# Patient Record
Sex: MALE | Race: AMERICAN INDIAN OR ALASKA NATIVE | NOT HISPANIC OR LATINO
[De-identification: names, ages, dates, MRNs, and addresses within clinical notes are randomized per-mention and may not be internally consistent; named-entity substitution may affect disease eponyms.]

---

## 2023-02-22 PROBLEM — Z00.00 ENCOUNTER FOR PREVENTIVE HEALTH EXAMINATION: Status: ACTIVE | Noted: 2023-02-22

## 2023-02-27 ENCOUNTER — APPOINTMENT (OUTPATIENT)
Dept: OTOLARYNGOLOGY | Facility: CLINIC | Age: 33
End: 2023-02-27
Payer: MEDICAID

## 2023-02-27 ENCOUNTER — NON-APPOINTMENT (OUTPATIENT)
Age: 33
End: 2023-02-27

## 2023-02-27 VITALS
HEART RATE: 78 BPM | WEIGHT: 156 LBS | BODY MASS INDEX: 25.07 KG/M2 | SYSTOLIC BLOOD PRESSURE: 119 MMHG | DIASTOLIC BLOOD PRESSURE: 76 MMHG | HEIGHT: 66.14 IN

## 2023-02-27 PROCEDURE — 99204 OFFICE O/P NEW MOD 45 MIN: CPT

## 2023-02-27 RX ORDER — FLUTICASONE FUROATE AND VILANTEROL 200; 25 UG/1; UG/1
POWDER RESPIRATORY (INHALATION)
Refills: 0 | Status: DISCONTINUED | COMMUNITY

## 2023-02-27 NOTE — PHYSICAL EXAM
[Normal] : mucosa is normal [Midline] : trachea located in midline position [de-identified] : obstructing polyps R>L

## 2023-02-27 NOTE — REVIEW OF SYSTEMS
[Post Nasal Drip] : post nasal drip [Nasal Congestion] : nasal congestion [Negative] : Heme/Lymph [As Noted in HPI] : as noted in HPI [FreeTextEntry6] : noisy breathing

## 2023-02-27 NOTE — HISTORY OF PRESENT ILLNESS
[de-identified] : 32 yr old male w long hx of nasal polyps\par Had surgery in [place], but they've grown back.\par rare sinusitis\par occ epistaxis\par +flonase qd\par allergy eval last week +dog, milk.  No dogs at home\par -FH polyps\par -asthma

## 2023-02-27 NOTE — ASSESSMENT
[FreeTextEntry1] : Informed consent for steroids: We reviewed the risks of oral prednisone with include, but are not limited to: mood lability, irritability, appetite stimulation, anxiety, hypertension, difficulty sleeping, vivid dreams, hyperglycemia, cataracts, increased intra-ocular pressure, GI upset, increased bone turnover causing or exacerbating osteopenia, and risk of avascular neurosis of the hip and long bones. Verbal informed consent was obtained for the use of prednisone.\par \par rx medrol dosepak\par continue flonase\par \par CT PNS\par f/u after study is complete for surgical eval

## 2023-03-06 ENCOUNTER — APPOINTMENT (OUTPATIENT)
Dept: CT IMAGING | Facility: CLINIC | Age: 33
End: 2023-03-06
Payer: MEDICAID

## 2023-03-06 ENCOUNTER — TRANSCRIPTION ENCOUNTER (OUTPATIENT)
Age: 33
End: 2023-03-06

## 2023-03-06 PROCEDURE — 70486 CT MAXILLOFACIAL W/O DYE: CPT

## 2023-03-15 ENCOUNTER — APPOINTMENT (OUTPATIENT)
Dept: OTOLARYNGOLOGY | Facility: CLINIC | Age: 33
End: 2023-03-15
Payer: MEDICAID

## 2023-03-15 VITALS
SYSTOLIC BLOOD PRESSURE: 112 MMHG | BODY MASS INDEX: 26.57 KG/M2 | DIASTOLIC BLOOD PRESSURE: 71 MMHG | HEIGHT: 66.14 IN | HEART RATE: 67 BPM | WEIGHT: 165.34 LBS

## 2023-03-15 PROCEDURE — 99214 OFFICE O/P EST MOD 30 MIN: CPT | Mod: 25

## 2023-03-15 PROCEDURE — 31231 NASAL ENDOSCOPY DX: CPT

## 2023-03-15 RX ORDER — BUDESONIDE 0.5 MG/2ML
0.5 INHALANT ORAL
Qty: 120 | Refills: 0 | Status: ACTIVE | COMMUNITY
Start: 2023-03-15 | End: 1900-01-01

## 2023-03-15 NOTE — DATA REVIEWED
[de-identified] : CT sinus:\par FINDINGS:\par FRONTAL SINUSES:  Opacified by abnormal soft tissue bilaterally\par ETHMOID SINUSES:  Opacified on the left and nearly opacified on the right with sinus expansion most notable involving a posterior ethmoid air cell/Onodi cell on the right with elevation of the sinus roof and associated bone dehiscence (601:105)\par MAXILLARY SINUSES:  Marked polypoid mucosal thickening\par SPHENOID SINUSES:  Opacified by abnormal soft tissue with partial erosion of the sinus septum.\par NASAL SEPTUM:  Deviated to the left\par NASAL CAVITY/NASOPHARYNX:  Multiple polypoid soft tissue masses are seen nearly obstructing the nasal cavities bilaterally. There is partial aeration of the inferior meatus/lower central nasal cavity. Polypoid soft tissue extends into the anterior nasopharynx bilaterally.\par POSTOP CHANGES:  There is nonvisualization of multiple ethmoid septations as well as the uncinate processes and medial maxillary sinus wall. While this is likely due to deossification from chronic inflammation, the possibility of prior surgery is not excluded.\par RIGHT OSTIOMEATAL UNIT:  See above. Abnormal soft tissue obstructs the physiologic infundibulum and middle meatus.\par LEFT OSTIOMEATAL UNIT:  See above. Abnormal soft tissue obstructs the infundibulum and middle meatus.\par SPHENOETHMOIDAL RECESSES:  Obstructed by abnormal soft tissue\par RIGHT FRONTAL RECESS:  Obstructed by abnormal soft tissue\par LEFT FRONTAL RECESS:  Obstructed by abnormal soft tissue. An intersinus septal cell drains into the left frontal recess.\par BONES:  Focal areas of bone dehiscence are seen involving the right cribriform plate, fovea ethmoidalis and planum sphenoidale (601: 89, 91, 95, 105). There is focal dehiscence of the right optic canal (601:109). There is dehiscence of the left sphenoid roof at the interface with the internal carotid artery (601:113).\par VISUALIZED INTRACRANIAL STRUCTURES:  Normal.\par ORBITAL CONTENTS:  Normal.\par MISCELLANEOUS:  Central hyperdense soft tissue is seen within all sinuses.\par \par IMPRESSION:   Sinus opacification with bone expansion and multiple nasal polyps compatible with sinonasal polyposis.\par \par Hyperdense soft tissue is seen within the sinuses likely representing allergic fungal sinusitis.\par \par Nasal septal deviation to the left.\par \par Polypoid soft tissue producing near complete obstruction of the nasal cavities.\par \par Obstruction of anterior and posterior drainage pathways.\par \par Focal areas sinocranial bone dehiscence as described above.

## 2023-03-15 NOTE — HISTORY OF PRESENT ILLNESS
[de-identified] : Abelardo Ibarra is a 33 yo male with hx chronic sinusitis with nasal polyposis s/p previous sinus surgery in [place] four months ago who presents for evaluation. He has worsening nasal obstruction for the past 8-9 months. He has been using aller-aime. He recently completed oral steroids and has been using flonase. After ths, he has had some relief. He notes forehead pressure. He notes yellow/green rhinorrhea and postnasal drainage. He denies recurrent sinus infections. He denies recent fevers chills, vision changes, or pain/restrictoin of extraocular movements. He denies constant clear rhinorrhea or salty/metallic taste. He has had allergy testing and this was positive for several allergens per his report.

## 2023-03-15 NOTE — REVIEW OF SYSTEMS
[Seasonal Allergies] : seasonal allergies [Nasal Congestion] : nasal congestion [Sinus Pressure] : sinus pressure [Discolored Nasal Discharge] : discolored nasal discharge [Negative] : Heme/Lymph

## 2023-03-15 NOTE — ASSESSMENT
[FreeTextEntry1] : Abelardo Ibarra presents for evaluation of chronic sinusitis with nasal polyposis. He has history of chronic rhinitis secondary to allergy. He had sinus surgery four years ago in [place]. He has been on flonase and medrol dose pack. On sinonasal endoscopy, he has septal deviation to the left and diffuse bilateral sinonasal polyposis. His CT sinus was reviewed and this showed evidecen of diffuse sinus disease and polyps as well as possible allergic fungal sinusitis. In addition, he has areas of skull base dehiscence, but has no clinical evidence of CSF leak. Will start budesonide rinses and antibiotics for his current infection. Will also refer to Dr. Muñiz for allergy evaluation as well as Dr. Arellano for possible surgery given his focal areas of skull base dehiscence.\par \par - Budesonide rinses BID.\par - Augmentin x 10 days. Side effects were discussed and include but are not limited to nausea, vomiting, diarrhea, and skin rash.\par - Refer to Dr. Muñiz and Dr. Arellano.\par - Follow up prn.

## 2023-03-20 ENCOUNTER — APPOINTMENT (OUTPATIENT)
Dept: PEDIATRIC ALLERGY IMMUNOLOGY | Facility: CLINIC | Age: 33
End: 2023-03-20
Payer: MEDICAID

## 2023-03-20 DIAGNOSIS — J30.89 OTHER ALLERGIC RHINITIS: ICD-10-CM

## 2023-03-20 DIAGNOSIS — R43.0 ANOSMIA: ICD-10-CM

## 2023-03-20 PROCEDURE — 99203 OFFICE O/P NEW LOW 30 MIN: CPT | Mod: 25

## 2023-03-20 PROCEDURE — 95004 PERQ TESTS W/ALRGNC XTRCS: CPT

## 2023-03-20 RX ORDER — METHYLPREDNISOLONE 4 MG/1
4 TABLET ORAL
Qty: 1 | Refills: 0 | Status: COMPLETED | COMMUNITY
Start: 2023-02-27 | End: 2023-03-20

## 2023-03-20 NOTE — REVIEW OF SYSTEMS
[Nasal Congestion] : nasal congestion [Reduced sense of smell] : reduced sense of smell [Nl] : Integumentary [Immunizations are up to date] : Immunizations are up to date

## 2023-03-20 NOTE — SOCIAL HISTORY
[House] : [unfilled] lives in a house  [Central Forced Air] : heating provided by central forced air [Central] : air conditioning provided by central unit [Bedroom] :  in bedroom [Living Area] : in living area [None] : none [Smokers in Household] : there are no smokers in the home

## 2023-03-20 NOTE — HISTORY OF PRESENT ILLNESS
[Asthma] : asthma [Eczematous rashes] : eczematous rashes [Food Allergies] : food allergies [Drug Allergies] : drug allergies [de-identified] : 32 y.o male presents for allergy evaluation. Patient has hx chronic sinusitis with nasal polyposis s/p previous sinus surgery in [place] four years ago.  Post op patient did very very but was not kept on any intranasal steroids. His nasal congestion resurfaced within 1-2 years but was mild. Now he has worsening nasal obstruction and anosmia for the past 8-9 months. He has been using fluticasone nasal spray with no relief. He recently completed medrol dose pack 2/27/23 which provided relief for about a month and symptoms resurfaced. He denies any concomitant asthma nor NSAID sensitivity. He's had allergy testing done by his pcp which showed pollen and dog allergy which he does not have. He denies recurrent sinusitis or infections of other kinds. \par \par He has seen ENT Dr. Trevor Martinez and on 3/15/23 sinonasal endoscopy showed septal deviation to the left and diffuse bilateral sinonasal polyposis. CT sinus 3/6/23 showed evidence of diffuse sinus disease and polyps as well as possible allergic fungal sinusitis and areas of skull base dehiscence. Patient started on Budesonide 0.5mg sinus rinses and placed on Augmentin 875mg BID x10d. Now that he is 5 days into regimen he has had partial improvement and feels polyps slightly smaller. Despite the improvement his congestion is quite severe.\par

## 2023-03-20 NOTE — CONSULT LETTER
[Dear  ___] : Dear  [unfilled], [Consult Letter:] : I had the pleasure of evaluating your patient, [unfilled]. [Please see my note below.] : Please see my note below. [Consult Closing:] : Thank you very much for allowing me to participate in the care of this patient.  If you have any questions, please do not hesitate to contact me. [Sincerely,] : Sincerely, [FreeTextEntry3] : Elvie HASSAN\par

## 2023-03-20 NOTE — IMPRESSION
[_____] : trees ([unfilled]) [Allergy Testing Mixed Feathers] : feathers [Allergy Testing Cockroach] : cockroach [Allergy Testing Cat] : cat [Allergy Testing Weeds] : weeds [Allergy Testing Grasses] : grasses [________] : [unfilled]

## 2023-03-20 NOTE — PHYSICAL EXAM
[Alert] : alert [Well Nourished] : well nourished [Healthy Appearance] : healthy appearance [No Acute Distress] : no acute distress [Well Developed] : well developed [Normal Voice/Communication] : normal voice communication [Normal Pupil & Iris Size/Symmetry] : normal pupil and iris size and symmetry [No Discharge] : no discharge [No Photophobia] : no photophobia [Sclera Not Icteric] : sclera not icteric [Normal TMs] : both tympanic membranes were normal [Normal Lips/Tongue] : the lips and tongue were normal [Normal Outer Ear/Nose] : the ears and nose were normal in appearance [Normal Tonsils] : normal tonsils [No Thrush] : no thrush [No Neck Mass] : no neck mass was observed [Normal Rate and Effort] : normal respiratory rhythm and effort [Bilateral Audible Breath Sounds] : bilateral audible breath sounds [Normal Rate] : heart rate was normal  [Normal Cervical Lymph Nodes] : cervical [Skin Intact] : skin intact  [No Rash] : no rash [Normal Mood] : mood was normal [Normal Affect] : affect was normal [Judgment and Insight Age Appropriate] : judgement and insight is age appropriate [Alert, Awake, Oriented as Age-Appropriate] : alert, awake, oriented as age appropriate [de-identified] : Large B/L nasal polyps R>L

## 2023-03-20 NOTE — ASSESSMENT
[FreeTextEntry1] : 32 y.o male with hx of chronic sinusitis with nasal polyps s/p surgery in [place] 4 years ago presents with nasal congestion that resurfaced 1-2 yrs post op and evidence of both nasal/sinus polyps on PE and CT sinus.\par \par Skin test today shows: large positives to Dust mites DF/DP and minimal positives to Asp fumigatus, Penicillium, hickory tree and dog dander.\par \par Dust mite precautions discussed\par \par Discussed with patient Dupixent as tx option after revision surgery or instead of surgery should he not be a candidate.\par \par Immunotherapy can be considered after revision surgery if pt uncomfortable with use of biologics and Budesonide rinses ineffective at preventing polyp regrowth.\par \par Suggest:\par - Continue Budesonide sinus rinses BID\par - Complete Augmentin course\par -F/u with ENT\par \par \par

## 2023-03-23 ENCOUNTER — APPOINTMENT (OUTPATIENT)
Dept: OTOLARYNGOLOGY | Facility: CLINIC | Age: 33
End: 2023-03-23
Payer: MEDICAID

## 2023-03-23 VITALS
TEMPERATURE: 97.9 F | WEIGHT: 165 LBS | HEART RATE: 66 BPM | SYSTOLIC BLOOD PRESSURE: 100 MMHG | DIASTOLIC BLOOD PRESSURE: 63 MMHG | HEIGHT: 66 IN | BODY MASS INDEX: 26.52 KG/M2

## 2023-03-23 PROCEDURE — 99214 OFFICE O/P EST MOD 30 MIN: CPT | Mod: 25

## 2023-03-23 PROCEDURE — 31231 NASAL ENDOSCOPY DX: CPT

## 2023-03-23 NOTE — PHYSICAL EXAM
[Nasal Endoscopy Performed] : nasal endoscopy was performed, see procedure section for findings [Normal] :  tongue is normal [de-identified] : EOMI, visual fields grossly intact

## 2023-03-23 NOTE — REASON FOR VISIT
[Subsequent Evaluation] : a subsequent evaluation for [FreeTextEntry2] : chronic sinusitis with nasal polyposis

## 2023-03-23 NOTE — ASSESSMENT
[FreeTextEntry1] : 32M with hx of FESS 4 years ago, with recurrent polyps. 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  SOB

## 2023-03-23 NOTE — PROCEDURE
[FreeTextEntry6] : Bilateral nasal endoscopy:\par \par Left:\par Septum severely deviated left\par Mild inferior turbinate hypertrophy \par Polyps to the nasal floo r\par \par Right: \par Septum midline\par Polyps to the nasal floor\par \par

## 2023-03-23 NOTE — HISTORY OF PRESENT ILLNESS
[de-identified] : 32 year old male presents for follow up for chronic sinusitis with nasal polyposis. History of sinus surgery in [place] 4 years ago. States for the last year nasal congestion has gotten progressively worse, cannot breathe from his nose, post nasal drip at night, sinus pressure and pain, poor sense of smell, and has intermittent metallic taste in mouth. States he saw Dr Martinez 3/15/23 was given Augmentin currently still taking and Budesonide nasal spray. States saw allergist 3/20/23 positive allergies to dust mites, dog ,molds and trees. Currently using Budesonide nasal spray daily, and sinus rinses 2x per day. Denies fevers or anterior rhinorrhea. \par \par CT Sinus 3/6/23\par IMPRESSION:   Sinus opacification with bone expansion and multiple nasal polyps compatible with sinonasal polyposis.\par Hyperdense soft tissue is seen within the sinuses likely representing allergic fungal sinusitis.\par Nasal septal deviation to the left.\par Polypoid soft tissue producing near complete obstruction of the nasal cavities.\par Obstruction of anterior and posterior drainage pathways.\par Focal areas sinocranial bone dehiscence as described above.

## 2023-03-23 NOTE — CONSULT LETTER
[Consult Letter:] : I had the pleasure of evaluating your patient, [unfilled]. [Please see my note below.] : Please see my note below. [Consult Closing:] : Thank you very much for allowing me to participate in the care of this patient.  If you have any questions, please do not hesitate to contact me. [Sincerely,] : Sincerely, [FreeTextEntry2] : Dr Martinez [FreeTextEntry3] : Win Arellano MD, NILDA\par Otolaryngology \par Sinus and Endoscopic Skull Base Surgery \par Head and Neck Surgery\par \par [address], [facility] [[email or web address]] [address]\[place] [address]\par \par [address],\[place] [address]\par \par Tel: [phone]\par Fax:[phone]

## 2023-05-02 ENCOUNTER — OUTPATIENT (OUTPATIENT)
Dept: OUTPATIENT SERVICES | Facility: HOSPITAL | Age: 33
LOS: 1 days | End: 2023-05-02

## 2023-05-02 VITALS
SYSTOLIC BLOOD PRESSURE: 110 MMHG | HEART RATE: 66 BPM | HEIGHT: 66 IN | DIASTOLIC BLOOD PRESSURE: 70 MMHG | TEMPERATURE: 98 F | OXYGEN SATURATION: 98 % | RESPIRATION RATE: 16 BRPM | WEIGHT: 151.9 LBS

## 2023-05-02 DIAGNOSIS — J30.89 OTHER ALLERGIC RHINITIS: ICD-10-CM

## 2023-05-02 DIAGNOSIS — Z98.890 OTHER SPECIFIED POSTPROCEDURAL STATES: Chronic | ICD-10-CM

## 2023-05-02 LAB
ANION GAP SERPL CALC-SCNC: 12 MMOL/L — SIGNIFICANT CHANGE UP (ref 7–14)
BLD GP AB SCN SERPL QL: NEGATIVE — SIGNIFICANT CHANGE UP
BUN SERPL-MCNC: 10 MG/DL — SIGNIFICANT CHANGE UP (ref 7–23)
CALCIUM SERPL-MCNC: 9.6 MG/DL — SIGNIFICANT CHANGE UP (ref 8.4–10.5)
CHLORIDE SERPL-SCNC: 102 MMOL/L — SIGNIFICANT CHANGE UP (ref 98–107)
CO2 SERPL-SCNC: 26 MMOL/L — SIGNIFICANT CHANGE UP (ref 22–31)
CREAT SERPL-MCNC: 0.67 MG/DL — SIGNIFICANT CHANGE UP (ref 0.5–1.3)
EGFR: 129 ML/MIN/1.73M2 — SIGNIFICANT CHANGE UP
GLUCOSE SERPL-MCNC: 72 MG/DL — SIGNIFICANT CHANGE UP (ref 70–99)
HCT VFR BLD CALC: 45.4 % — SIGNIFICANT CHANGE UP (ref 39–50)
HGB BLD-MCNC: 15.3 G/DL — SIGNIFICANT CHANGE UP (ref 13–17)
MCHC RBC-ENTMCNC: 29 PG — SIGNIFICANT CHANGE UP (ref 27–34)
MCHC RBC-ENTMCNC: 33.7 GM/DL — SIGNIFICANT CHANGE UP (ref 32–36)
MCV RBC AUTO: 86.1 FL — SIGNIFICANT CHANGE UP (ref 80–100)
NRBC # BLD: 0 /100 WBCS — SIGNIFICANT CHANGE UP (ref 0–0)
NRBC # FLD: 0 K/UL — SIGNIFICANT CHANGE UP (ref 0–0)
PLATELET # BLD AUTO: 202 K/UL — SIGNIFICANT CHANGE UP (ref 150–400)
POTASSIUM SERPL-MCNC: 4.3 MMOL/L — SIGNIFICANT CHANGE UP (ref 3.5–5.3)
POTASSIUM SERPL-SCNC: 4.3 MMOL/L — SIGNIFICANT CHANGE UP (ref 3.5–5.3)
RBC # BLD: 5.27 M/UL — SIGNIFICANT CHANGE UP (ref 4.2–5.8)
RBC # FLD: 11.9 % — SIGNIFICANT CHANGE UP (ref 10.3–14.5)
RH IG SCN BLD-IMP: POSITIVE — SIGNIFICANT CHANGE UP
SODIUM SERPL-SCNC: 140 MMOL/L — SIGNIFICANT CHANGE UP (ref 135–145)
WBC # BLD: 6.32 K/UL — SIGNIFICANT CHANGE UP (ref 3.8–10.5)
WBC # FLD AUTO: 6.32 K/UL — SIGNIFICANT CHANGE UP (ref 3.8–10.5)

## 2023-05-02 RX ORDER — SODIUM CHLORIDE 9 MG/ML
1000 INJECTION, SOLUTION INTRAVENOUS
Refills: 0 | Status: DISCONTINUED | OUTPATIENT
Start: 2023-05-10 | End: 2023-05-10

## 2023-05-02 NOTE — H&P PST ADULT - PROBLEM SELECTOR PLAN 1
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 Pt given verbal and written instructions with teach back on pepcid. Pt verbalized understanding with return demonstration.    Labs done. 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 Pt given verbal and written instructions with teach back on pepcid. Pt verbalized understanding with return demonstration.    Labs done.    Patient instructed to take Flonase nasal spray on the morning of procedure.

## 2023-05-02 NOTE — H&P PST ADULT - NSANTHBPHIGHRD_ENT_A_CORE
Interval History: NAEON    Review of Systems    Constitutional:  Negative for chills and fever.   HENT:  Negative for congestion, facial swelling, sinus pressure and trouble swallowing.    Respiratory:  Negative for cough, shortness of breath and wheezing.    Cardiovascular:  Negative for chest pain and palpitations.   Gastrointestinal:  Negative for abdominal distention, abdominal pain, nausea and vomiting.   Genitourinary:  Negative for difficulty urinating, dysuria, flank pain, frequency and urgency.   Musculoskeletal:  Negative for arthralgias, back pain and myalgias.   Skin:  Negative for color change and wound.   Neurological: denied headache, dizziness  Psychiatric/Behavioral:  Negative for sleep disturbance. The patient is not nervous/anxious.    All other systems reviewed and are negative.      Objective:     Vital Signs (Most Recent):  Temp: 98.1 °F (36.7 °C) (12/17/22 0741)  Pulse: 69 (12/17/22 0741)  Resp: 20 (12/17/22 0741)  BP: (!) 154/70 (12/17/22 0741)  SpO2: 96 % (12/17/22 0741)   Vital Signs (24h Range):  Temp:  [96.8 °F (36 °C)-98.6 °F (37 °C)] 98.1 °F (36.7 °C)  Pulse:  [59-83] 69  Resp:  [14-20] 20  SpO2:  [93 %-96 %] 96 %  BP: (131-182)/(60-77) 154/70     Weight: 62.5 kg (137 lb 12.6 oz)  Body mass index is 26.03 kg/m².    Intake/Output Summary (Last 24 hours) at 12/17/2022 1103  Last data filed at 12/17/2022 0400  Gross per 24 hour   Intake --   Output 750 ml   Net -750 ml      Physical Exam    HENT:      Head: Normocephalic and atraumatic.   Cardiovascular:      Rate and Rhythm: Regular rhythm.      Heart sounds: No murmur heard.  Pulmonary:      Effort: Pulmonary effort is normal. No respiratory distress.      Breath sounds: Normal breath sounds. No wheezing.   Abdominal:      General: Bowel sounds are normal. There is no distension.      Palpations: Abdomen is soft.      Tenderness: There is no abdominal tenderness.   Musculoskeletal:         General: No swelling.   Skin:     General: Skin  is warm and dry.   Neurological:      Mental Status: She is alert. Mental status is at baseline.      Comments: Pt oriented to person, place and birth date- as per family pt at baseline mentation  Psychiatric:         Attention and Perception: Attention normal.         Speech: Speech normal.          Significant Labs: All pertinent labs within the past 24 hours have been reviewed.  CBC:   Recent Labs   Lab 12/15/22  1128   WBC 8.08   HGB 13.4   HCT 41.6        CMP:   Recent Labs   Lab 12/15/22  1128      K 4.0      CO2 29      BUN 18   CREATININE 0.7   CALCIUM 9.7   PROT 6.9   ALBUMIN 3.5   BILITOT 1.0   ALKPHOS 80   AST 21   ALT 15   ANIONGAP 11       Significant Imaging:     Imaging Results              X-Ray Chest AP Portable (Final result)  Result time 12/15/22 11:00:51      Final result by Fernando Royal MD (12/15/22 11:00:51)                   Impression:      No acute findings.      Electronically signed by: Fernando Royal MD  Date:    12/15/2022  Time:    11:00               Narrative:    EXAMINATION:  XR CHEST AP PORTABLE    CLINICAL HISTORY:  Chest Pain;    TECHNIQUE:  Single frontal view of the chest was performed.    COMPARISON:  11/14/2022    FINDINGS:  The cardiomediastinal silhouette is normal.  Aortic atherosclerosis.    The lungs are clear.  No pleural effusions.    No acute osseous findings.  Spinal stimulator in the thoracic canal.  Surgical clips left upper quadrant.                                      Normal vision: sees adequately in most situations; can see medication labels, newsprint No

## 2023-05-02 NOTE — H&P PST ADULT - MUSCULOSKELETAL
ROM intact/normal gait/strength 5/5 bilateral upper extremities/strength 5/5 bilateral lower extremities negative ROM intact/no calf tenderness/normal gait/strength 5/5 bilateral upper extremities/strength 5/5 bilateral lower extremities/extremities exam

## 2023-05-02 NOTE — H&P PST ADULT - HISTORY OF PRESENT ILLNESS
32 year old male with pre op dx of other allergic rhinitis is scheduled for bilateral maxillary antrostomy , total sphenoethmoidectomy, frontal sinusotomy, resection infectious lesion of the anterior cranial fossa ( ROSALINE) .

## 2023-05-02 NOTE — H&P PST ADULT - NSANTHOSAYNRD_GEN_A_CORE
No. ZAKI screening performed.  STOP BANG Legend: 0-2 = LOW Risk; 3-4 = INTERMEDIATE Risk; 5-8 = HIGH Risk

## 2023-05-02 NOTE — H&P PST ADULT - NSANTHOBSERVEDRD_ENT_A_CORE
Back pain most consistent with musculoskeletal pain, no fever, no hx of IVDA, no trauma, no weakness, no bowel or bladder incontinence  1) pain control  2) reassess No

## 2023-05-09 ENCOUNTER — TRANSCRIPTION ENCOUNTER (OUTPATIENT)
Age: 33
End: 2023-05-09

## 2023-05-09 NOTE — ASU PATIENT PROFILE, ADULT - FALL HARM RISK - UNIVERSAL INTERVENTIONS
Bed in lowest position, wheels locked, appropriate side rails in place/Call bell, personal items and telephone in reach/Instruct patient to call for assistance before getting out of bed or chair/Non-slip footwear when patient is out of bed/[place] to call system/Physically safe environment - no spills, clutter or unnecessary equipment/Purposeful Proactive Rounding/Room/bathroom lighting operational, light cord in reach

## 2023-05-10 ENCOUNTER — APPOINTMENT (OUTPATIENT)
Dept: OTOLARYNGOLOGY | Facility: HOSPITAL | Age: 33
End: 2023-05-10

## 2023-05-10 ENCOUNTER — TRANSCRIPTION ENCOUNTER (OUTPATIENT)
Age: 33
End: 2023-05-10

## 2023-05-10 ENCOUNTER — RESULT REVIEW (OUTPATIENT)
Age: 33
End: 2023-05-10

## 2023-05-10 ENCOUNTER — INPATIENT (INPATIENT)
Facility: HOSPITAL | Age: 33
LOS: 0 days | Discharge: HOME | End: 2023-05-11
Attending: STUDENT IN AN ORGANIZED HEALTH CARE EDUCATION/TRAINING PROGRAM | Admitting: STUDENT IN AN ORGANIZED HEALTH CARE EDUCATION/TRAINING PROGRAM
Payer: COMMERCIAL

## 2023-05-10 VITALS
WEIGHT: 151.9 LBS | TEMPERATURE: 98 F | HEIGHT: 66 IN | OXYGEN SATURATION: 98 % | RESPIRATION RATE: 16 BRPM | DIASTOLIC BLOOD PRESSURE: 74 MMHG | HEART RATE: 64 BPM | SYSTOLIC BLOOD PRESSURE: 115 MMHG

## 2023-05-10 DIAGNOSIS — Z98.890 OTHER SPECIFIED POSTPROCEDURAL STATES: Chronic | ICD-10-CM

## 2023-05-10 DIAGNOSIS — J30.89 OTHER ALLERGIC RHINITIS: ICD-10-CM

## 2023-05-10 LAB
ANION GAP SERPL CALC-SCNC: 9 MMOL/L — SIGNIFICANT CHANGE UP (ref 7–14)
BUN SERPL-MCNC: 9 MG/DL — SIGNIFICANT CHANGE UP (ref 7–23)
CALCIUM SERPL-MCNC: 8.7 MG/DL — SIGNIFICANT CHANGE UP (ref 8.4–10.5)
CHLORIDE SERPL-SCNC: 103 MMOL/L — SIGNIFICANT CHANGE UP (ref 98–107)
CO2 SERPL-SCNC: 26 MMOL/L — SIGNIFICANT CHANGE UP (ref 22–31)
CREAT SERPL-MCNC: 0.79 MG/DL — SIGNIFICANT CHANGE UP (ref 0.5–1.3)
EGFR: 123 ML/MIN/1.73M2 — SIGNIFICANT CHANGE UP
GLUCOSE SERPL-MCNC: 145 MG/DL — HIGH (ref 70–99)
HCT VFR BLD CALC: 34.1 % — LOW (ref 39–50)
HGB BLD-MCNC: 11.8 G/DL — LOW (ref 13–17)
MAGNESIUM SERPL-MCNC: 1.7 MG/DL — SIGNIFICANT CHANGE UP (ref 1.6–2.6)
MCHC RBC-ENTMCNC: 29.4 PG — SIGNIFICANT CHANGE UP (ref 27–34)
MCHC RBC-ENTMCNC: 34.6 GM/DL — SIGNIFICANT CHANGE UP (ref 32–36)
MCV RBC AUTO: 85 FL — SIGNIFICANT CHANGE UP (ref 80–100)
NRBC # BLD: 0 /100 WBCS — SIGNIFICANT CHANGE UP (ref 0–0)
NRBC # FLD: 0 K/UL — SIGNIFICANT CHANGE UP (ref 0–0)
PHOSPHATE SERPL-MCNC: 3.2 MG/DL — SIGNIFICANT CHANGE UP (ref 2.5–4.5)
PLATELET # BLD AUTO: 181 K/UL — SIGNIFICANT CHANGE UP (ref 150–400)
POTASSIUM SERPL-MCNC: 4.3 MMOL/L — SIGNIFICANT CHANGE UP (ref 3.5–5.3)
POTASSIUM SERPL-SCNC: 4.3 MMOL/L — SIGNIFICANT CHANGE UP (ref 3.5–5.3)
RBC # BLD: 4.01 M/UL — LOW (ref 4.2–5.8)
RBC # FLD: 11.6 % — SIGNIFICANT CHANGE UP (ref 10.3–14.5)
SODIUM SERPL-SCNC: 138 MMOL/L — SIGNIFICANT CHANGE UP (ref 135–145)
WBC # BLD: 17.04 K/UL — HIGH (ref 3.8–10.5)
WBC # FLD AUTO: 17.04 K/UL — HIGH (ref 3.8–10.5)

## 2023-05-10 PROCEDURE — 88305 TISSUE EXAM BY PATHOLOGIST: CPT | Mod: 26

## 2023-05-10 PROCEDURE — 88311 DECALCIFY TISSUE: CPT | Mod: 26

## 2023-05-10 PROCEDURE — 88304 TISSUE EXAM BY PATHOLOGIST: CPT | Mod: 26

## 2023-05-10 PROCEDURE — 93010 ELECTROCARDIOGRAM REPORT: CPT

## 2023-05-10 PROCEDURE — 88312 SPECIAL STAINS GROUP 1: CPT | Mod: 26

## 2023-05-10 PROCEDURE — 99223 1ST HOSP IP/OBS HIGH 75: CPT

## 2023-05-10 DEVICE — STENT DRUG ELUTING SINUS INTERSECT ENT PROPEL MINI 16MM
Type: IMPLANTABLE DEVICE | Site: BILATERAL | Status: NON-FUNCTIONAL
Removed: 2023-05-10

## 2023-05-10 DEVICE — ARISTA 1GR
Type: IMPLANTABLE DEVICE | Site: BILATERAL | Status: NON-FUNCTIONAL
Removed: 2023-05-10

## 2023-05-10 RX ORDER — SODIUM CHLORIDE 9 MG/ML
500 INJECTION, SOLUTION INTRAVENOUS ONCE
Refills: 0 | Status: DISCONTINUED | OUTPATIENT
Start: 2023-05-10 | End: 2023-05-10

## 2023-05-10 RX ORDER — ONDANSETRON 8 MG/1
4 TABLET, FILM COATED ORAL ONCE
Refills: 0 | Status: DISCONTINUED | OUTPATIENT
Start: 2023-05-10 | End: 2023-05-10

## 2023-05-10 RX ORDER — ACETAMINOPHEN 500 MG
650 TABLET ORAL EVERY 6 HOURS
Refills: 0 | Status: DISCONTINUED | OUTPATIENT
Start: 2023-05-10 | End: 2023-05-11

## 2023-05-10 RX ORDER — OXYCODONE HYDROCHLORIDE 5 MG/1
1 TABLET ORAL
Qty: 10 | Refills: 0
Start: 2023-05-10

## 2023-05-10 RX ORDER — SODIUM CHLORIDE 9 MG/ML
1000 INJECTION, SOLUTION INTRAVENOUS
Refills: 0 | Status: DISCONTINUED | OUTPATIENT
Start: 2023-05-10 | End: 2023-05-11

## 2023-05-10 RX ORDER — HYDROMORPHONE HYDROCHLORIDE 2 MG/ML
0.5 INJECTION INTRAMUSCULAR; INTRAVENOUS; SUBCUTANEOUS
Refills: 0 | Status: DISCONTINUED | OUTPATIENT
Start: 2023-05-10 | End: 2023-05-10

## 2023-05-10 RX ORDER — OXYCODONE HYDROCHLORIDE 5 MG/1
5 TABLET ORAL EVERY 6 HOURS
Refills: 0 | Status: DISCONTINUED | OUTPATIENT
Start: 2023-05-10 | End: 2023-05-11

## 2023-05-10 RX ADMIN — Medication 100 MILLIGRAM(S): at 18:26

## 2023-05-10 NOTE — BRIEF OPERATIVE NOTE - OPERATION/FINDINGS
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 Propel minis in frontal, nasopore, wili, crockett splints

## 2023-05-10 NOTE — PROVIDER CONTACT NOTE (OTHER) - ACTION/TREATMENT ORDERED:
EKG done Patient to be reevaluate by MD by 5pm.  Patient feels better intervention. EKG done Patient to be reevaluate by MD by 5pm.  Patient feels better intervention. Orthostatic blood pressure done.

## 2023-05-10 NOTE — ASU DISCHARGE PLAN (ADULT/PEDIATRIC) - NURSING INSTRUCTIONS
You received IV Tylenol for pain management at 11:35AM. Please DO NOT take any Tylenol (Acetaminophen) containing products, such as Vicodin, Percocet, Excedrin, and cold medications for the next 6 hours (until 5:35PM). DO NOT TAKE MORE THAN 3000 MG OF TYLENOL in a 24 hour period. You received IV Tylenol for pain management at 11:45AM. Please DO NOT take any Tylenol (Acetaminophen) containing products, such as Vicodin, Percocet, Excedrin, and cold medications for the next 6 hours (until 5:45PM). DO NOT TAKE MORE THAN 3000 MG OF TYLENOL in a 24 hour period.

## 2023-05-10 NOTE — ASU DISCHARGE PLAN (ADULT/PEDIATRIC) - NS MD DC FALL RISK RISK
For information on Fall & Injury Prevention, visit: [email or web address] OR  [email or web address] OR  https://www.cdc.gov/steadi/patient.html

## 2023-05-10 NOTE — PATIENT PROFILE ADULT - FALL HARM RISK - HARM RISK INTERVENTIONS

## 2023-05-10 NOTE — PROVIDER CONTACT NOTE (OTHER) - SITUATION
Patient  in bathroom felt dizzy assisted to sit in the ground. by YOAN Durham and Assisted manager . Patient placed in chair with assistance ,. Patient  in bathroom felt dizzy assisted to sit in the ground. by YOAN Durham and Assisted manager .Patient placed in chair with assistance ,.Patient denies hitting head or any trauma to head.  Dr. Porras Patient in bathroom felt dizzy assisted to sitin the ground. by RN aMrva and Assisted manager Pt. placed in chair with assistance Pt.denies hitting head or any trauma to head.  in unit.

## 2023-05-10 NOTE — PROVIDER CONTACT NOTE (OTHER) - ACTION/TREATMENT ORDERED:
Patient to be admitted to floor. Fluids encouraged. Stat CBC to be ordered. Patient reports feeling better at this time, denies dizziness while lying. Will continue to monitor. Patient to be admitted to floor. Fluids encouraged. Stat CBC drawn and sent. Patient reports feeling better at this time, denies dizziness while lying. Will continue to monitor.

## 2023-05-10 NOTE — PROVIDER CONTACT NOTE (OTHER) - ASSESSMENT
See ASU vital signs flowsheet. BP decreased during standing position. Patient placed back to stretcher.

## 2023-05-11 ENCOUNTER — TRANSCRIPTION ENCOUNTER (OUTPATIENT)
Age: 33
End: 2023-05-11

## 2023-05-11 VITALS
HEART RATE: 67 BPM | TEMPERATURE: 98 F | DIASTOLIC BLOOD PRESSURE: 69 MMHG | OXYGEN SATURATION: 97 % | RESPIRATION RATE: 18 BRPM | SYSTOLIC BLOOD PRESSURE: 137 MMHG

## 2023-05-11 DIAGNOSIS — D64.9 ANEMIA, UNSPECIFIED: ICD-10-CM

## 2023-05-11 DIAGNOSIS — I95.1 ORTHOSTATIC HYPOTENSION: ICD-10-CM

## 2023-05-11 DIAGNOSIS — Z98.890 OTHER SPECIFIED POSTPROCEDURAL STATES: ICD-10-CM

## 2023-05-11 LAB
ANION GAP SERPL CALC-SCNC: 11 MMOL/L — SIGNIFICANT CHANGE UP (ref 7–14)
BUN SERPL-MCNC: 9 MG/DL — SIGNIFICANT CHANGE UP (ref 7–23)
CALCIUM SERPL-MCNC: 8.4 MG/DL — SIGNIFICANT CHANGE UP (ref 8.4–10.5)
CHLORIDE SERPL-SCNC: 105 MMOL/L — SIGNIFICANT CHANGE UP (ref 98–107)
CO2 SERPL-SCNC: 23 MMOL/L — SIGNIFICANT CHANGE UP (ref 22–31)
CREAT SERPL-MCNC: 0.67 MG/DL — SIGNIFICANT CHANGE UP (ref 0.5–1.3)
EGFR: 129 ML/MIN/1.73M2 — SIGNIFICANT CHANGE UP
GLUCOSE BLDC GLUCOMTR-MCNC: 178 MG/DL — HIGH (ref 70–99)
GLUCOSE SERPL-MCNC: 108 MG/DL — HIGH (ref 70–99)
HCT VFR BLD CALC: 28.2 % — LOW (ref 39–50)
HGB BLD-MCNC: 9.9 G/DL — LOW (ref 13–17)
MAGNESIUM SERPL-MCNC: 2 MG/DL — SIGNIFICANT CHANGE UP (ref 1.6–2.6)
MCHC RBC-ENTMCNC: 29.2 PG — SIGNIFICANT CHANGE UP (ref 27–34)
MCHC RBC-ENTMCNC: 35.1 GM/DL — SIGNIFICANT CHANGE UP (ref 32–36)
MCV RBC AUTO: 83.2 FL — SIGNIFICANT CHANGE UP (ref 80–100)
NRBC # BLD: 0 /100 WBCS — SIGNIFICANT CHANGE UP (ref 0–0)
NRBC # FLD: 0 K/UL — SIGNIFICANT CHANGE UP (ref 0–0)
PHOSPHATE SERPL-MCNC: 3.2 MG/DL — SIGNIFICANT CHANGE UP (ref 2.5–4.5)
PLATELET # BLD AUTO: 198 K/UL — SIGNIFICANT CHANGE UP (ref 150–400)
POTASSIUM SERPL-MCNC: 3.9 MMOL/L — SIGNIFICANT CHANGE UP (ref 3.5–5.3)
POTASSIUM SERPL-SCNC: 3.9 MMOL/L — SIGNIFICANT CHANGE UP (ref 3.5–5.3)
RBC # BLD: 3.39 M/UL — LOW (ref 4.2–5.8)
RBC # FLD: 11.8 % — SIGNIFICANT CHANGE UP (ref 10.3–14.5)
SODIUM SERPL-SCNC: 139 MMOL/L — SIGNIFICANT CHANGE UP (ref 135–145)
WBC # BLD: 14.5 K/UL — HIGH (ref 3.8–10.5)
WBC # FLD AUTO: 14.5 K/UL — HIGH (ref 3.8–10.5)

## 2023-05-11 PROCEDURE — 99239 HOSP IP/OBS DSCHRG MGMT >30: CPT

## 2023-05-11 RX ORDER — SODIUM CHLORIDE 9 MG/ML
1000 INJECTION, SOLUTION INTRAVENOUS
Refills: 0 | Status: DISCONTINUED | OUTPATIENT
Start: 2023-05-11 | End: 2023-05-11

## 2023-05-11 RX ADMIN — Medication 100 MILLIGRAM(S): at 05:49

## 2023-05-11 NOTE — CONSULT NOTE ADULT - PROBLEM SELECTOR RECOMMENDATION 3
- As per primary ENT team  - Leukocytosis likely reactive to recent procedure, and is on doxycycline for it as per orders, can monitor for now

## 2023-05-11 NOTE — CONSULT NOTE ADULT - ASSESSMENT
33 y/o M with no pmhx presented to the hospital for nasal surgery. Found to have orthostatic hypotension, likely from anesthesia and dehydration. Admit for orthostatic hypotension.

## 2023-05-11 NOTE — CONSULT NOTE ADULT - PROBLEM SELECTOR RECOMMENDATION 9
Assessment:  - Patient likely had orthostatic hypotension secondary to anesthesia post op and some bleeding  - Improved with IV fluids  - No obvious cardiac causes at this time, EKG normal    Plan:  - Can increase IV fluids to 250cc/hr  - No indication for cardiac work up since the patient has no cardiac hx with normal EKG  - Would reassess for improvement of dizziness in the AM, redo orthostatics in the AM  - encourage oral diet Assessment:  - Patient likely had orthostatic hypotension secondary to anesthesia post op and some bleeding  - Improved with IV fluids  - No obvious cardiac causes at this time, EKG normal    Plan:  - Can increase IV fluids to 250cc/hr until 1L total  - No indication for cardiac work up since the patient has no cardiac hx with normal EKG  - Would reassess for improvement of dizziness in the AM, redo orthostatics in the AM  - encourage oral diet

## 2023-05-11 NOTE — DISCHARGE NOTE PROVIDER - HOSPITAL COURSE
32 year old male with allergic rhinitis S/P FESS on 5/10/2023 admited to floor for post op near syncope due to dizziness. Medicine consulted for positive orthostetic BP of 73/43 when change position from sitting to standing. EKG and finger glucose point of care done both were normal. Post op H/H also monitored. Patient is monitored with IVF, symptoms improves, all vital sign stablle. Tolerating PO diet and pain is controlled. Patient was cleared for discharge home by Dr. Arellano on 5/11/2023. All prescriptions were sent to a pharmacy that was agreed on with the patient.

## 2023-05-11 NOTE — DISCHARGE NOTE PROVIDER - NSDCMRMEDTOKEN_GEN_ALL_CORE_FT
doxycycline hyclate 100 mg oral capsule: 1 cap(s) orally 2 times a day  Clive Marcos ANNEMARIE 50mcg nasal spray BID:   oxyCODONE 5 mg oral tablet: 1 tab(s) orally every 6 hours as needed for  severe pain MDD: 4 tabs  predniSONE 10 mg oral tablet: 1 tab(s) orally once a day 4 tabs (40mg) daily for 3 days, then 3 tabs (30mg) daily for 3 days, then 2 tabs (20mg) daily for 3 days, then 1 tab (10mg) daily for 3 days.

## 2023-05-11 NOTE — DISCHARGE NOTE PROVIDER - CARE PROVIDER_API CALL
Win Arellano)  [facility]aryng[facility]  [address] [place] [address]  Phone: [phone]  Fax: [phone]  Scheduled Appointment: 05/18/2023

## 2023-05-11 NOTE — DISCHARGE NOTE PROVIDER - NSDCCPCAREPLAN_GEN_ALL_CORE_FT
PRINCIPAL DISCHARGE DIAGNOSIS  Diagnosis: Allergic rhinitis  Assessment and Plan of Treatment: - Please follow up with Dr. Arellano outpatient as scheduled

## 2023-05-11 NOTE — DISCHARGE NOTE NURSING/CASE MANAGEMENT/SOCIAL WORK - NSDCPEFALRISK_GEN_ALL_CORE
For information on Fall & Injury Prevention, visit: [email or web address] OR  [email or web address] OR  https://www.cdc.gov/steadi/patient.html

## 2023-05-11 NOTE — DISCHARGE NOTE NURSING/CASE MANAGEMENT/SOCIAL WORK - PATIENT PORTAL LINK FT
You can access the FollowMyHealth Patient Portal offered by [facility] by registering at the following website: [email or web address]. By joining [employer]’s FollowMyHealth portal, you will also be able to view your health information using other applications (apps) compatible with our system.

## 2023-05-11 NOTE — PROGRESS NOTE ADULT - SUBJECTIVE AND OBJECTIVE BOX
OTOLARYNGOLOGY (ENT) PROGRESS NOTE    PATIENT: MARSHALL WHITE  MRN: [id number]  : [birth year]  [id number]  DATE OF SERVICE:  23  	  Subjective/ Interval:   AFVSS. No acute events overnight. Patient seen and examined at bedside.    ALLERGIES:  No Known Allergies      MEDICATIONS:  Antiinfectives:   doxycycline monohydrate Capsule 100 milliGRAM(s) Oral every 12 hours    IV fluids:  lactated ringers. 1000 milliLiter(s) IV Continuous <Continuous>    Hematologic/Anticoagulation:    Pain medications/Neuro:  acetaminophen     Tablet .. 650 milliGRAM(s) Oral every 6 hours PRN  oxyCODONE    IR 5 milliGRAM(s) Oral every 6 hours PRN    Endocrine Medications:     All other standing medications:     All other PRN medications:    Vital Signs Last 24 Hrs  T(C): 36.5 (11 May 2023 06:00), Max: 37.1 (10 May 2023 14:15)  T(F): 97.7 (11 May 2023 06:00), Max: 98.7 (10 May 2023 14:15)  HR: 69 (11 May 2023 06:00) (61 - 117)  BP: 100/56 (11 May 2023 06:00) (73/34 - 120/75)  BP(mean): 81 (10 May 2023 13:30) (70 - 81)  RR: 18 (11 May 2023 06:00) (12 - 18)  SpO2: 97% (11 May 2023 06:00) (96% - 100%)    Parameters below as of 11 May 2023 06:00  Patient On (Oxygen Delivery Method): room air          05-10 @ 07:  -   @ 07:00  --------------------------------------------------------  IN:    Lactated Ringers: 900 mL    Lactated Ringers Bolus: 500 mL    Oral Fluid: 800 mL  Total IN: 2200 mL    OUT:    Voided (mL): 975 mL  Total OUT: 975 mL    Total NET: 1225 mL    General: well-developed, NAD  Eyes: EOMI; PERRL; no drainage or redness  Ears: external ears normal  Nose: nares patent, no nasal drainage on mustache dressing  Mouth: No oral lesions; no gross abnormalities  Neck: trachea midline  Respiratory: unlabored respirations  Cardiovascular: regular rate  Gastrointestinal: Soft, nondistended  Extremities: No edema, warm and well perfused  Skin: No lesions; no rash                     LABS                       9.9    14.50 )-----------( 198      ( 11 May 2023 05:35 )             28.2        139  |  105  |  9   ----------------------------<  108<H>  3.9   |  23  |  0.67    Ca    8.4      11 May 2023 05:35  Phos  3.2       Mg     2.00     11           Coagulation Studies-       Endocrine Panel-  Calcium, Total Serum: 8.4 mg/dL ( @ 05:35)  Calcium, Total Serum: 8.7 mg/dL (05-10 @ 21:33)                MICROBIOLOGY:

## 2023-05-11 NOTE — PROGRESS NOTE ADULT - ASSESSMENT
Patient is a 32M s/p FESS admitted for post op pre-syncope, no fall or head trauma, + orthostatic hypotension 5/10.    Plan:  - c/w IV fluids  - f/u AM cbc  - encourage PO intake

## 2023-05-11 NOTE — CONSULT NOTE ADULT - PROBLEM SELECTOR RECOMMENDATION 2
- Likely secondary to recent procedure and volume repletion from IV fluids  - No obvious signs of bleeding  - Can repeat CBC in the AM - Likely secondary to post-op bleeding from recent procedure and volume repletion from IV fluids  - No obvious signs of bleeding  - Can repeat CBC in the AM, if downtrending, would recommend sending type and screen to prepare for possible transfusion - Likely secondary to post-op bleeding from recent procedure and volume repletion from IV fluids  - No obvious signs of bleeding other than from the nasal surgery site  - Can repeat CBC in the AM, if downtrending, would recommend sending type and screen to prepare for possible transfusion

## 2023-05-11 NOTE — DISCHARGE NOTE NURSING/CASE MANAGEMENT/SOCIAL WORK - NSDCPETBCESMAN_GEN_ALL_CORE
On Lovenox, will transition to oral anticoagulation on discharge. If you are a smoker, it is important for your health to stop smoking. Please be aware that second hand smoke is also harmful.

## 2023-05-11 NOTE — CONSULT NOTE ADULT - SUBJECTIVE AND OBJECTIVE BOX
This is a 33 y/o M with no pmhx presented to the hospital for nasal surgery. The patient did not report any acute events post op, however, did endorse that he felt dizzy after standing up after the procedure. Exact details unclear, RN note reviewed. Denies loss of consciousness. The patient was found to have positive orthostatics. Denies cardiac hx. Denies palpitations or chest pains, denies headaches. At bedside, the patient reports feeling significantly better tonight. No SOB. [place]. No fevers at this time. Vital signs reviewed - 155/68 sitting -> 75/34 sitting, Vital signs 108/62 from 2am.    Patient stood up for me at bedside, endorsed only mild lightheadedness, no other symptoms at this time    PMhx: none  SurgHx: s/p nasal surgery 05/10/2023  Socialhx: denies tobacco use, ETOH use, illicit drug use  Allergies: NKDA  Familyhx: denies significant family hx    REVIEW OF SYSTEMS:    CONSTITUTIONAL: No weakness, fevers or chills  EYES/ENT: No visual changes;  No dysphagia; No sore throat; No rhinorrhea; No sinus pain/pressure  NECK: No pain or stiffness  RESPIRATORY: No cough, wheezing, hemoptysis; No shortness of breath  CARDIOVASCULAR: No chest pain or palpitations; No lower extremity edema  GASTROINTESTINAL: No abdominal or epigastric pain. No nausea, vomiting, or hematemesis; No diarrhea or constipation. No melena or hematochezia.  GENITOURINARY: No dysuria, frequency or hematuria  NEUROLOGICAL: No numbness or weakness, + lightheadedness  MSK: ambulates without assistance  SKIN: No itching, burning, rashes, or lesions   All other review of systems is negative unless indicated above.    PHYSICAL EXAM:  VITALS: Vital Signs Last 24 Hrs  T(C): 36.8 (11 May 2023 01:49), Max: 37.1 (10 May 2023 14:15)  T(F): 98.3 (11 May 2023 01:49), Max: 98.7 (10 May 2023 14:15)  HR: 73 (11 May 2023 01:49) (61 - 117)  BP: 108/62 (11 May 2023 01:49) (73/34 - 120/75)  BP(mean): 81 (10 May 2023 13:30) (70 - 81)  RR: 18 (11 May 2023 01:49) (12 - 18)  SpO2: 97% (11 May 2023 01:49) (96% - 100%)    Parameters below as of 11 May 2023 01:49  Patient On (Oxygen Delivery Method): room air      GENERAL: NAD, comfortable at bedside  HEAD:  Atraumatic, Normocephalic  EYES: EOMI, PERRL, conjunctiva and sclera clear  ENT: Moist Mucus Membranes present, no ulcers appreciated, + bandage on the nose  NECK: Supple, No JVD  CHEST/LUNG: Clear to auscultation bilaterally; No wheezes, rales or rhonchi, no accessory muscle use  HEART: Regular rate and rhythm; No murmurs, rubs, or gallops, (+)S1, S2  ABDOMEN: Soft, Nontender, Nondistended; Normal Bowel sounds   EXTREMITIES:  2+ Peripheral Pulses, No clubbing, cyanosis, or edema  PSYCH: normal mood and affect  NEUROLOGY: AAOx3, non-focal  SKIN: No rashes or lesions    Labs:                        11.8   17.04 )-----------( 181      ( 10 May 2023 17:38 )             34.1       05-10    138  |  103  |  9   ----------------------------<  145<H>  4.3   |  26  |  0.79    Ca    8.7      10 May 2023 21:33  Phos  3.2     05-10  Mg     1.70     05-10      EKG: As per my read - NSR no ST changes

## 2023-05-18 ENCOUNTER — APPOINTMENT (OUTPATIENT)
Dept: OTOLARYNGOLOGY | Facility: CLINIC | Age: 33
End: 2023-05-18
Payer: MEDICAID

## 2023-05-18 PROBLEM — J30.89 OTHER ALLERGIC RHINITIS: Chronic | Status: ACTIVE | Noted: 2023-05-02

## 2023-05-18 PROCEDURE — 99024 POSTOP FOLLOW-UP VISIT: CPT

## 2023-05-18 PROCEDURE — 31237 NSL/SINS NDSC SURG BX POLYPC: CPT | Mod: RT,79

## 2023-05-18 NOTE — HISTORY OF PRESENT ILLNESS
[de-identified] : 32 year old male presents for follow up s/p FESS, with septoplasty 5/10/23 for AFS. States has poor sense of smell,  and has slight sinus pressure and pain. Currently doing sinus rinses 2x, has 2-3 days of antibiotic left. Denies fevers, nasal congestion, anterior rhinorrhea,or  post nasal drip.

## 2023-05-18 NOTE — PROCEDURE
[FreeTextEntry6] : Bilateral nasal cavity debridement (CPT 43141)\par \par Indication: s/p FESS for AFS\par \par Procedure: \par There was expected post operative inflammation in both the right and left nasal cavity. Thick mucoid secretions and blood clot were suctioned. Doyles removed.\par \par Left: \par The septum is midline\par The inferior turbinate was well reduced/outfractured\par The MT was resected\par The max is clear\par There was significant crusting in the sphenoethmoid cavity. This was removed with forceps. Nasopore was then suctioned. The sphenoethmoid cavity was noted to be healing appropriately. \par The frontal outflow tract was patent. The propel stent was removed with forceps. \par \par Right: \par The septum is midline\par The inferior turbinate was well reduced/outfractured\par The MT was resected\par The max is clear\par There was significant crusting in the sphenoethmoid cavity. This was removed with forceps. Nasopore was then suctioned. The sphenoethmoid cavity was noted to be healing appropriately. \par The frontal outflow tract was patent. The propel stent was removed with forceps.\par

## 2023-05-18 NOTE — REASON FOR VISIT
[Post-Operative Visit] : a post-operative visit [Family Member] : family member [FreeTextEntry2] : s/p FESS, with septoplasty 5/10/23

## 2023-05-23 LAB — SURGICAL PATHOLOGY STUDY: SIGNIFICANT CHANGE UP

## 2023-06-06 ENCOUNTER — APPOINTMENT (OUTPATIENT)
Dept: OTOLARYNGOLOGY | Facility: CLINIC | Age: 33
End: 2023-06-06
Payer: MEDICAID

## 2023-06-06 DIAGNOSIS — J33.9 NASAL POLYP, UNSPECIFIED: ICD-10-CM

## 2023-06-06 DIAGNOSIS — J32.9 CHRONIC SINUSITIS, UNSPECIFIED: ICD-10-CM

## 2023-06-06 PROCEDURE — 99212 OFFICE O/P EST SF 10 MIN: CPT | Mod: 25

## 2023-06-06 PROCEDURE — 31237 NSL/SINS NDSC SURG BX POLYPC: CPT | Mod: 50,58

## 2023-06-06 RX ORDER — AMOXICILLIN AND CLAVULANATE POTASSIUM 875; 125 MG/1; MG/1
875-125 TABLET, COATED ORAL
Qty: 20 | Refills: 0 | Status: COMPLETED | COMMUNITY
Start: 2023-03-15 | End: 2023-06-06

## 2023-06-06 NOTE — PROCEDURE
[FreeTextEntry6] : Bilateral nasal cavity debridement (CPT 29181)\par \par Indication: s/p FESS for AFS\par \par Procedure: \par There was expected post operative inflammation in both the right and left nasal cavity. Thick mucoid secretions and blood clot were suctioned.\par \par Left: \par The septum is midline\par The inferior turbinate was well reduced/outfractured\par The MT was resected\par The max is clear\par There was mild crusting in the sphenoethmoid cavity. This was removed with forceps and suction. The sphenoethmoid cavity was noted to be healing appropriately. There was dense edema of the sphenoid face but able to access the deep clival recess \par The frontal outflow tract was patent. The propel stent was removed with forceps with some residual, some crusting present. Both medial and lateral sinuses were patent\par \par Right: \par The septum is midline\par The inferior turbinate was well reduced/outfractured\par The MT was resected\par The max is clear\par There was significant crusting in the sphenoethmoid cavity. This was removed with forceps. The sphenoethmoid cavity was noted to be healing appropriately. \par The frontal outflow tract was patent. The propel stent was removed with forceps with some residual left in place. Crusting suctioned and removed with forceps\par

## 2023-06-06 NOTE — HISTORY OF PRESENT ILLNESS
[de-identified] : 32 year old male presents for follow up s/p FESS, with septoplasty 5/10/23. States has a foul smell inside both his nares, but feeling better overall. Currently doing budesonide rinses 2x per day. Denies nasal congestion, anterior rhinorrhea, post nasal drip, sinus pain and pressure, poor sense of smell or fevers. \par

## 2023-06-06 NOTE — REASON FOR VISIT
[Subsequent Evaluation] : a subsequent evaluation for [FreeTextEntry2] : s/p FESS, with septoplasty 5/10/23

## 2023-07-11 ENCOUNTER — APPOINTMENT (OUTPATIENT)
Dept: OTOLARYNGOLOGY | Facility: CLINIC | Age: 33
End: 2023-07-11
Payer: MEDICAID

## 2023-07-11 VITALS
HEART RATE: 58 BPM | SYSTOLIC BLOOD PRESSURE: 116 MMHG | WEIGHT: 165 LBS | BODY MASS INDEX: 26.52 KG/M2 | DIASTOLIC BLOOD PRESSURE: 76 MMHG | HEIGHT: 66 IN

## 2023-07-11 PROCEDURE — 31237 NSL/SINS NDSC SURG BX POLYPC: CPT | Mod: 50,58

## 2023-07-11 PROCEDURE — 99212 OFFICE O/P EST SF 10 MIN: CPT | Mod: 25

## 2023-07-11 RX ORDER — BUDESONIDE 1 MG/2ML
1 INHALANT ORAL
Qty: 30 | Refills: 3 | Status: COMPLETED | COMMUNITY
Start: 2023-06-06 | End: 2023-07-11

## 2023-07-11 RX ORDER — BUDESONIDE 0.5 MG/2ML
0.5 INHALANT ORAL TWICE DAILY
Qty: 1 | Refills: 2 | Status: ACTIVE | COMMUNITY
Start: 2023-07-11 | End: 1900-01-01

## 2023-07-11 NOTE — HISTORY OF PRESENT ILLNESS
[de-identified] : 32M presenting s/p FESS, with septoplasty 5/10/23. Has not been doing sinus rinses or using budesonide for the last few weeks. Denies nasal congestion, sinus pressure/pain or difficulty breathing. Denies rhinorrhea. He has good sense of smell.

## 2023-07-11 NOTE — PROCEDURE
[FreeTextEntry6] : Bilateral nasal cavity debridement (CPT 30476)\par \par Indication: s/p FESS for AFS\par \par Procedure: \par There was expected post operative inflammation in both the right and left nasal cavity. Thick mucoid secretions and blood clot were suctioned.\par \par Left: \par The septum is midline\par The inferior turbinate was well reduced/outfractured\par The MT was resected\par The max is clear\par There was minimal edema in the sphenoethmoid cavity. The sphenoethmoid cavity was noted to be healing appropriately. There was minor adhesion within the sphenoid but was able to access the deep clival recess \par The frontal outflow tract was patent. Both medial and lateral sinuses were patent\par \par Right: \par The septum is midline\par The inferior turbinate was well reduced/outfractured\par The MT was resected\par The max is clear\par There was mild crusting along the ethomid skull base, which was removed with suction and forceps. There was minimal edema of the ethmoid skull base.\par The sphenoethmoid cavity was noted to be healing appropriately. \par The frontal outflow tract was patent. \par \par

## 2023-10-09 PROBLEM — J31.0 CHRONIC RHINITIS: Status: ACTIVE | Noted: 2023-03-15

## 2023-10-09 PROBLEM — J30.89 ALLERGIC FUNGAL SINUSITIS (AFS): Status: ACTIVE | Noted: 2023-03-23

## 2023-10-10 ENCOUNTER — APPOINTMENT (OUTPATIENT)
Dept: OTOLARYNGOLOGY | Facility: CLINIC | Age: 33
End: 2023-10-10
Payer: MEDICAID

## 2023-10-10 VITALS
BODY MASS INDEX: 26.52 KG/M2 | DIASTOLIC BLOOD PRESSURE: 71 MMHG | HEIGHT: 66 IN | SYSTOLIC BLOOD PRESSURE: 113 MMHG | HEART RATE: 76 BPM | WEIGHT: 165 LBS

## 2023-10-10 DIAGNOSIS — J30.89 OTHER ALLERGIC RHINITIS: ICD-10-CM

## 2023-10-10 DIAGNOSIS — J31.0 CHRONIC RHINITIS: ICD-10-CM

## 2023-10-10 DIAGNOSIS — B49 OTHER ALLERGIC RHINITIS: ICD-10-CM

## 2023-10-10 PROCEDURE — 31231 NASAL ENDOSCOPY DX: CPT

## 2023-10-10 PROCEDURE — 99213 OFFICE O/P EST LOW 20 MIN: CPT | Mod: 25

## 2023-10-10 RX ORDER — AZELASTINE HYDROCHLORIDE 137 UG/1
137 SPRAY, METERED NASAL TWICE DAILY
Qty: 1 | Refills: 0 | Status: ACTIVE | COMMUNITY
Start: 2023-10-10 | End: 1900-01-01

## 2024-04-09 ENCOUNTER — APPOINTMENT (OUTPATIENT)
Dept: OTOLARYNGOLOGY | Facility: CLINIC | Age: 34
End: 2024-04-09

## 2024-06-12 ENCOUNTER — RX RENEWAL (OUTPATIENT)
Age: 34
End: 2024-06-12

## 2024-06-12 RX ORDER — BUDESONIDE 0.5 MG/2ML
0.5 INHALANT ORAL TWICE DAILY
Qty: 3 | Refills: 3 | Status: ACTIVE | COMMUNITY
Start: 2023-10-10 | End: 1900-01-01

## 2024-11-05 ENCOUNTER — APPOINTMENT (OUTPATIENT)
Dept: OTOLARYNGOLOGY | Facility: CLINIC | Age: 34
End: 2024-11-05
Payer: MEDICAID

## 2024-11-05 VITALS
HEIGHT: 66 IN | SYSTOLIC BLOOD PRESSURE: 112 MMHG | WEIGHT: 165 LBS | HEART RATE: 75 BPM | BODY MASS INDEX: 26.52 KG/M2 | DIASTOLIC BLOOD PRESSURE: 68 MMHG

## 2024-11-05 DIAGNOSIS — J32.9 CHRONIC SINUSITIS, UNSPECIFIED: ICD-10-CM

## 2024-11-05 DIAGNOSIS — J31.0 CHRONIC RHINITIS: ICD-10-CM

## 2024-11-05 DIAGNOSIS — B49 OTHER ALLERGIC RHINITIS: ICD-10-CM

## 2024-11-05 DIAGNOSIS — J30.89 OTHER ALLERGIC RHINITIS: ICD-10-CM

## 2024-11-05 PROCEDURE — 99213 OFFICE O/P EST LOW 20 MIN: CPT | Mod: 25

## 2024-11-05 PROCEDURE — 31231 NASAL ENDOSCOPY DX: CPT

## 2025-05-06 ENCOUNTER — APPOINTMENT (OUTPATIENT)
Dept: OTOLARYNGOLOGY | Facility: CLINIC | Age: 35
End: 2025-05-06

## 2025-05-06 DIAGNOSIS — J31.0 CHRONIC RHINITIS: ICD-10-CM

## 2025-05-06 DIAGNOSIS — J33.9 NASAL POLYP, UNSPECIFIED: ICD-10-CM

## 2025-05-06 DIAGNOSIS — J30.89 OTHER ALLERGIC RHINITIS: ICD-10-CM

## 2025-05-06 DIAGNOSIS — J32.9 CHRONIC SINUSITIS, UNSPECIFIED: ICD-10-CM

## 2025-05-06 DIAGNOSIS — B49 OTHER ALLERGIC RHINITIS: ICD-10-CM

## (undated) DEVICE — SYR LUER LOK 5CC

## (undated) DEVICE — ELCTR COAGULATOR HANDSWITCHING 10FR

## (undated) DEVICE — DRSG NASOPORE 4CM FIRM

## (undated) DEVICE — SOL ANTI FOG

## (undated) DEVICE — SUT CHROMIC 4-0 18" G-2

## (undated) DEVICE — BLADE MEDTRONIC ENT RAD 60 DEGREE ROTATABLE 4MM X 11CM

## (undated) DEVICE — ENDO SCRUB

## (undated) DEVICE — DRSG SPLINT INTRA NASAL .5MM OVERSIZE THICK

## (undated) DEVICE — SOL IRR POUR NS 0.9% 500ML

## (undated) DEVICE — LUBRICATING JELLY ONESHOT 1.25OZ

## (undated) DEVICE — MEDTRONIC INSTRUMENT TRACKER ENT

## (undated) DEVICE — SUT PLAIN GUT 4-0 18" SC-1

## (undated) DEVICE — VENODYNE/SCD SLEEVE CALF MEDIUM

## (undated) DEVICE — CLEANING SHEATH ENDO-SCRUB FOR STORZ 7230BVA SINUSCOPE 4MM 30 DEGREE

## (undated) DEVICE — MEDTRONIC AXIEM PATIENT TRACKER NON-INVASIVE

## (undated) DEVICE — STRYKER MALLEABLE SUCTION MEDIUM STANDARD

## (undated) DEVICE — PAD MEDTRONIC ENT ADHESIVE PAD

## (undated) DEVICE — ACCLARENT SET INFLATION DEVICE

## (undated) DEVICE — BLADE MEDTRONIC ENT TRICUT ROTATABLE STRAIGHT 4MM X 11CM

## (undated) DEVICE — PACK SMR

## (undated) DEVICE — Device

## (undated) DEVICE — CLEANING SHEATH ENDO-SCRUB FOR STORZ 7210AA TELESCOPE 4MM 0 DEGREE

## (undated) DEVICE — POSITIONER FOAM EGG CRATE ULNAR 2PCS (PINK)

## (undated) DEVICE — WARMING BLANKET FULL UNDERBODY

## (undated) DEVICE — BLADE MEDTRONIC ENT RAD 40 DEGREE ROTATABLE 4MM X 11CM

## (undated) DEVICE — LIJ-MEDTRONIC FUSION ENT NAVIGATION SET: Type: DURABLE MEDICAL EQUIPMENT

## (undated) DEVICE — LABELS BLANK W PEN

## (undated) DEVICE — CATH IV SAFE INSYTE 14G X 1.75" (ORANGE)

## (undated) DEVICE — TUBING SUCTION NONCONDUCTIVE 6MM X 12FT

## (undated) DEVICE — POSITIONER STRAP ARMBOARD VELCRO TS-30

## (undated) DEVICE — SYR CONTROL LUER LOK 10CC

## (undated) DEVICE — SUT ETHILON 3-0 30" FS-1

## (undated) DEVICE — DRSG SPLINT INTRA NASAL .5MM STANDARD THICK

## (undated) DEVICE — BLADE MEDTRONIC ENT FUSION TRICUT ROTATABLE STRAIGHT 4MM X 13CM

## (undated) DEVICE — DRSG NASOPORE 8CM FIRM

## (undated) DEVICE — GLV 7 PROTEXIS (WHITE)

## (undated) DEVICE — ELCTR BOVIE SUCTION 8FR 6"

## (undated) DEVICE — CANISTER DISPOSABLE THIN WALL 3000CC

## (undated) DEVICE — DRSG TELFA 3 X 8

## (undated) DEVICE — NDL HYPO REGULAR BEVEL 25G X 1.5" (BLUE)

## (undated) DEVICE — TUBING IRRIGATION STRAIGHT SHOT